# Patient Record
Sex: FEMALE | Race: OTHER | NOT HISPANIC OR LATINO
[De-identification: names, ages, dates, MRNs, and addresses within clinical notes are randomized per-mention and may not be internally consistent; named-entity substitution may affect disease eponyms.]

---

## 2018-09-13 PROBLEM — Z00.00 ENCOUNTER FOR PREVENTIVE HEALTH EXAMINATION: Status: ACTIVE | Noted: 2018-09-13

## 2018-09-14 ENCOUNTER — APPOINTMENT (OUTPATIENT)
Dept: NEUROSURGERY | Facility: CLINIC | Age: 53
End: 2018-09-14
Payer: MEDICAID

## 2018-09-14 PROCEDURE — 99213 OFFICE O/P EST LOW 20 MIN: CPT

## 2023-01-07 ENCOUNTER — EMERGENCY (EMERGENCY)
Facility: HOSPITAL | Age: 58
LOS: 0 days | Discharge: HOME | End: 2023-01-07
Attending: EMERGENCY MEDICINE | Admitting: EMERGENCY MEDICINE
Payer: MEDICAID

## 2023-01-07 VITALS
SYSTOLIC BLOOD PRESSURE: 122 MMHG | RESPIRATION RATE: 17 BRPM | OXYGEN SATURATION: 98 % | HEART RATE: 77 BPM | DIASTOLIC BLOOD PRESSURE: 58 MMHG | TEMPERATURE: 99 F

## 2023-01-07 DIAGNOSIS — R51.9 HEADACHE, UNSPECIFIED: ICD-10-CM
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
## 2023-01-07 DIAGNOSIS — S09.90XA UNSPECIFIED INJURY OF HEAD, INITIAL ENCOUNTER: ICD-10-CM
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
## 2023-01-07 PROCEDURE — 70450 CT HEAD/BRAIN W/O DYE: CPT | Mod: 26,MA

## 2023-01-07 PROCEDURE — 99284 EMERGENCY DEPT VISIT MOD MDM: CPT

## 2023-01-07 RX ORDER — ACETAMINOPHEN 500 MG
975 TABLET ORAL ONCE
Refills: 0 | Status: COMPLETED | OUTPATIENT
Start: 2023-01-07 | End: 2023-01-07

## 2023-01-07 RX ADMIN — Medication 975 MILLIGRAM(S): at 16:05

## 2023-01-07 NOTE — ED PROVIDER NOTE - OBJECTIVE STATEMENT
57-year-old female no pertinent past medical history presents for evaluation status post trip and fall.  Patient states she was getting something out of her car when she tripped backwards and hit her head on the cement.  Since has moderate aching left-sided headache, no aggravating or relieving factors.  Denies AC, LOC, change in vision, neck pain, chest pain, shortness of breath, weakness, numbness, dysuria, hematuria, vomiting, diarrhea.

## 2023-01-07 NOTE — ED PROVIDER NOTE - ATTENDING APP SHARED VISIT CONTRIBUTION OF CARE
56 yo f with no pmh presents with head trauma s/p trip and fall.  pt says was getting out of car and lost footing and fell back.  pt says hit her head on the cement.  +ha.  no loc.  no ac or asa use.  denies neck pain. no cp, no sob.  exam: nad, mild ttp posterior scalp, perrl, eomi, mmm, rrr, ctab, abd soft, nt, nd aox3, imp: pt with head trauma s/p trip and fall, ct head.  imp: pt with c/o head trauma, ct head, likely concussion, r/o ich

## 2023-01-07 NOTE — ED PROVIDER NOTE - NSFOLLOWUPCLINICS_GEN_ALL_ED_FT
[facility] Concussion Program  Concussion Program  [address] [place] [address]   Phone: [phone]  Fax:

## 2023-01-07 NOTE — ED PROVIDER NOTE - PATIENT PORTAL LINK FT
You can access the FollowMyHealth Patient Portal offered by [facility] by registering at the following website: [email or web address]. By joining [employer]’s FollowMyHealth portal, you will also be able to view your health information using other applications (apps) compatible with our system.

## 2023-01-07 NOTE — ED PROVIDER NOTE - CLINICAL SUMMARY MEDICAL DECISION MAKING FREE TEXT BOX
PRE-OP DIAGNOSIS:  History of breast cancer 13-Jan-2021 14:45:12  David Wade   pt with c/o head trauma, ct head neg for ich, likely concussion.  pt to f/u with concussion clinic outpt.  given [facility] precautions.  Pt and family was given strict return to ED precautions and pt indicated that he/she understood.

## 2023-01-07 NOTE — ED ADULT TRIAGE NOTE - CHIEF COMPLAINT QUOTE
pt co trip and fall hitting back of head pt remembers fall. denies LOC, AC and vomiting. co diffuse body aches

## 2025-08-14 ENCOUNTER — EMERGENCY (EMERGENCY)
Facility: HOSPITAL | Age: 60
LOS: 0 days | Discharge: HOME | End: 2025-08-14
Attending: EMERGENCY MEDICINE
Payer: COMMERCIAL

## 2025-08-14 ENCOUNTER — TRANSCRIPTION ENCOUNTER (OUTPATIENT)
Age: 60
End: 2025-08-14

## 2025-08-14 VITALS
DIASTOLIC BLOOD PRESSURE: 60 MMHG | HEART RATE: 67 BPM | OXYGEN SATURATION: 97 % | SYSTOLIC BLOOD PRESSURE: 101 MMHG | RESPIRATION RATE: 18 BRPM | WEIGHT: 63.27 LBS | TEMPERATURE: 98 F

## 2025-08-14 DIAGNOSIS — H53.9 UNSPECIFIED VISUAL DISTURBANCE: ICD-10-CM

## 2025-08-14 PROCEDURE — 99284 EMERGENCY DEPT VISIT MOD MDM: CPT

## 2025-08-14 PROCEDURE — 76512 OPH US DX B-SCAN: CPT | Mod: 50

## 2025-08-14 PROCEDURE — 99284 EMERGENCY DEPT VISIT MOD MDM: CPT | Mod: 25

## 2025-08-15 PROCEDURE — 76512 OPH US DX B-SCAN: CPT | Mod: 26,LT,RT
